# Patient Record
Sex: MALE | Race: BLACK OR AFRICAN AMERICAN
[De-identification: names, ages, dates, MRNs, and addresses within clinical notes are randomized per-mention and may not be internally consistent; named-entity substitution may affect disease eponyms.]

---

## 2018-11-21 ENCOUNTER — HOSPITAL ENCOUNTER (EMERGENCY)
Dept: HOSPITAL 62 - ER | Age: 33
Discharge: HOME | End: 2018-11-21
Payer: SELF-PAY

## 2018-11-21 VITALS — DIASTOLIC BLOOD PRESSURE: 79 MMHG | SYSTOLIC BLOOD PRESSURE: 133 MMHG

## 2018-11-21 DIAGNOSIS — R11.2: ICD-10-CM

## 2018-11-21 DIAGNOSIS — B34.9: Primary | ICD-10-CM

## 2018-11-21 DIAGNOSIS — M79.10: ICD-10-CM

## 2018-11-21 DIAGNOSIS — Z87.19: ICD-10-CM

## 2018-11-21 DIAGNOSIS — R05: ICD-10-CM

## 2018-11-21 DIAGNOSIS — R68.83: ICD-10-CM

## 2018-11-21 LAB
%HYPO/RBC NFR BLD AUTO: (no result) %
ADD MANUAL DIFF: (no result)
ADD MANUAL MICROSCOPIC: YES
ALBUMIN SERPL-MCNC: 3.9 G/DL (ref 3.5–5)
ALP SERPL-CCNC: 179 U/L (ref 38–126)
ALT SERPL-CCNC: 48 U/L (ref 21–72)
ANION GAP SERPL CALC-SCNC: 13 MMOL/L (ref 5–19)
ANISOCYTOSIS BLD QL SMEAR: (no result)
APPEARANCE UR: (no result)
APTT PPP: YELLOW S
AST SERPL-CCNC: 32 U/L (ref 17–59)
BACTERIA #/AREA URNS HPF: (no result) /HPF
BASOPHILS # BLD AUTO: 0 10^3/UL (ref 0–0.2)
BASOPHILS NFR BLD AUTO: 0.3 % (ref 0–2)
BILIRUB DIRECT SERPL-MCNC: 0.4 MG/DL (ref 0–0.4)
BILIRUB SERPL-MCNC: 0.5 MG/DL (ref 0.2–1.3)
BILIRUB UR QL STRIP: (no result)
BUN SERPL-MCNC: 13 MG/DL (ref 7–20)
CALCIUM: 10.2 MG/DL (ref 8.4–10.2)
CHLORIDE SERPL-SCNC: 101 MMOL/L (ref 98–107)
CO2 SERPL-SCNC: 23 MMOL/L (ref 22–30)
EOSINOPHIL # BLD AUTO: 0 10^3/UL (ref 0–0.6)
EOSINOPHIL NFR BLD AUTO: 0.4 % (ref 0–6)
ERYTHROCYTE [DISTWIDTH] IN BLOOD BY AUTOMATED COUNT: 24.1 % (ref 11.5–14)
GLUCOSE SERPL-MCNC: 102 MG/DL (ref 75–110)
GLUCOSE UR STRIP-MCNC: NEGATIVE MG/DL
GRAN CASTS #/AREA URNS LPF: (no result) /LPF
HCT VFR BLD CALC: 41.6 % (ref 37.9–51)
HGB BLD-MCNC: 13.7 G/DL (ref 13.5–17)
HYALINE CASTS #/AREA URNS LPF: (no result) /LPF
KETONES UR STRIP-MCNC: NEGATIVE MG/DL
LIPASE SERPL-CCNC: 39.7 U/L (ref 23–300)
LYMPHOCYTES # BLD AUTO: 0.7 10^3/UL (ref 0.5–4.7)
LYMPHOCYTES NFR BLD AUTO: 11.2 % (ref 13–45)
MCH RBC QN AUTO: 22.4 PG (ref 27–33.4)
MCHC RBC AUTO-ENTMCNC: 33 G/DL (ref 32–36)
MCV RBC AUTO: 68 FL (ref 80–97)
MONOCYTES # BLD AUTO: 0.8 10^3/UL (ref 0.1–1.4)
MONOCYTES NFR BLD AUTO: 12.4 % (ref 3–13)
NEUTROPHILS # BLD AUTO: 4.8 10^3/UL (ref 1.7–8.2)
NEUTS SEG NFR BLD AUTO: 75.7 % (ref 42–78)
NITRITE UR QL STRIP: NEGATIVE
OVALOCYTES BLD QL SMEAR: SLIGHT
PH UR STRIP: 5 [PH] (ref 5–9)
PLATELET # BLD: 315 10^3/UL (ref 150–450)
PLATELET COMMENT: ADEQUATE
PLATELET LARGE: PRESENT
POIKILOCYTOSIS BLD QL SMEAR: SLIGHT
POTASSIUM SERPL-SCNC: 4.6 MMOL/L (ref 3.6–5)
PROT SERPL-MCNC: 8.6 G/DL (ref 6.3–8.2)
PROT UR STRIP-MCNC: 100 MG/DL
RBC # BLD AUTO: 6.13 10^6/UL (ref 4.35–5.55)
RBC #/AREA URNS HPF: (no result) /HPF
SODIUM SERPL-SCNC: 137.1 MMOL/L (ref 137–145)
SP GR UR STRIP: 1.02
TOTAL CELLS COUNTED % (AUTO): 100 %
UROBILINOGEN UR-MCNC: NEGATIVE MG/DL (ref ?–2)
WBC # BLD AUTO: 6.4 10^3/UL (ref 4–10.5)
WBC CASTS URNS QL MICRO: (no result) /LPF

## 2018-11-21 PROCEDURE — 96374 THER/PROPH/DIAG INJ IV PUSH: CPT

## 2018-11-21 PROCEDURE — 99284 EMERGENCY DEPT VISIT MOD MDM: CPT

## 2018-11-21 PROCEDURE — 36415 COLL VENOUS BLD VENIPUNCTURE: CPT

## 2018-11-21 PROCEDURE — 96361 HYDRATE IV INFUSION ADD-ON: CPT

## 2018-11-21 PROCEDURE — 81001 URINALYSIS AUTO W/SCOPE: CPT

## 2018-11-21 PROCEDURE — 80053 COMPREHEN METABOLIC PANEL: CPT

## 2018-11-21 PROCEDURE — 96375 TX/PRO/DX INJ NEW DRUG ADDON: CPT

## 2018-11-21 PROCEDURE — 85025 COMPLETE CBC W/AUTO DIFF WBC: CPT

## 2018-11-21 PROCEDURE — 83690 ASSAY OF LIPASE: CPT

## 2018-11-21 PROCEDURE — S0028 INJECTION, FAMOTIDINE, 20 MG: HCPCS

## 2018-11-21 NOTE — ER DOCUMENT REPORT
ED General





- General


Chief Complaint: Nausea/Vomiting


Stated Complaint: GENERAL WEAKNESS


Time Seen by Provider: 11/21/18 12:51


Mode of Arrival: Medic


Information source: Patient, Emergency Med Personnel, [facility] Records


Notes: 





33-year-old male with colitis, chronic anemia presents with complaint of nausea

, vomiting and body aches that started 3 days prior to arrival.  Patient states 

that he has had approximately 2 episodes of vomiting per day.  He denies any 

blood.  Patient has diffuse body aches.  He has a intermittent nonproductive 

cough.  He does have sick contacts at home with similar symptoms.  Patient 

denies recent travel, recent antibiotic use.


TRAVEL OUTSIDE OF THE [place] IN LAST 30 DAYS: No





- HPI


Onset: Other


Onset/Duration: Gradual, Persistent


Quality of pain: Achy


Severity: Mild


Associated symptoms: Chills, Nausea, Vomiting


Exacerbated by: Food


Relieved by: Denies


Similar symptoms previously: Yes


Recently seen / treated by doctor: Yes





Past Medical History





- General


Information source: Patient, [facility] Records





- Social History


Smoking Status: Never Smoker


Chew tobacco use (# tins/day): No


Frequency of alcohol use: None


Drug Abuse: None


Lives with: Family


Family History: Reviewed & Not Pertinent


Patient has suicidal ideation: No


Patient has homicidal ideation: No


Renal/ Medical History: Denies: Hx Peritoneal Dialysis


GI Medical History: Reports: Other - Colitis





Review of Systems





- Review of Systems


Notes: 





REVIEW OF SYSTEMS:


CONSTITUTIONAL :  Denies fever,  chills, or sweats.  Denies recent illness. 

Denies weight loss, recent hospitalizations. 


EENT: Denies visual changes, eye pain.  Denies sore throat, oral lesions, 

difficulty swallowing.


CARDIOVASCULAR:  Denies chest pain.  Denies palpitations. Denies lower 

extremity edema.


RESPIRATORY:  Denies cough. Denies shortness of breath, wheezing.


GASTROINTESTINAL:  Denies abdominal pain or distention.  Denies diarrhea.  

Denies blood in vomitus, stools, or per rectum.  Denies black, tarry stools.  

Denies constipation.  


GENITOURINARY:  Denies difficulty urinating, painful urination,  frequency, 

blood in urine, testicular pain or penile discharge.


MUSCULOSKELETAL:  Denies  neck pain or stiffness.  Denies joint  swelling.


SKIN:   Denies rash, lesions or sores.


HEMATOLOGIC :   Denies easy bruising or bleeding.


LYMPHATIC:  Denies swollen glands.


NEUROLOGICAL:  Denies confusion or altered mental status.  Denies loss of 

consciousness.  Denies dizziness or lightheadedness.  Denies headache.  Denies 

weakness or paralysis.  Denies problems difficulty with ambulation, slurred 

speech.  Denies sensory loss, numbness, or tingling.  Denies seizures.


PSYCHIATRIC:  Denies anxiety or stress.  Denies depression, suicidal ideation, 

or





Physical Exam





- Vital signs


Vitals: 


 











Temp Pulse BP Pulse Ox


 


 98.5 F   91   133/79 H  100 


 


 11/21/18 15:33  11/21/18 15:33  11/21/18 15:33  11/21/18 15:33











Interpretation: No: Febrile





- Notes


Notes: 





PHYSICAL EXAMINATION:





GENERAL: Well-appearing, well-nourished and in no acute distress.





HEAD: Atraumatic, normocephalic.





EYES: Pupils equal round and reactive to light, extraocular movements intact, 

sclera anicteric, conjunctiva are normal.





ENT: Nares patent, oropharynx clear without exudates.  Moist mucous membranes.





NECK: Normal range of motion, supple without lymphadenopathy





LUNGS: Breath sounds clear to auscultation bilaterally and equal.  No wheezes 

rales or rhonchi.





HEART: Regular rate and rhythm without murmurs





ABDOMEN: Soft, nontender, nondistended abdomen.  No guarding, no rebound.  No 

masses appreciated.





Musculoskeletal: Normal range of motion, no pitting or edema.  No cyanosis.





NEUROLOGICAL: Cranial nerves grossly intact.  Normal speech, normal gait.  

Normal sensory, motor exams 





PSYCH: Normal mood, normal affect.





SKIN: Warm, Dry, normal turgor, no rashes or lesions noted.





Course





- Re-evaluation


Re-evalutation: 








Laboratory











  11/21/18 11/21/18 11/21/18





  13:30 13:30 14:01


 


WBC  6.4  


 


RBC  6.13 H  


 


Hgb  13.7  


 


Hct  41.6  


 


MCV  68 L  


 


MCH  22.4 L  


 


MCHC  33.0  


 


RDW  24.1 H  


 


Plt Count  315  


 


Seg Neutrophils %  75.7  


 


Lymphocytes %  11.2 L  


 


Monocytes %  12.4  


 


Eosinophils %  0.4  


 


Basophils %  0.3  


 


Absolute Neutrophils  4.8  


 


Absolute Lymphocytes  0.7  


 


Absolute Monocytes  0.8  


 


Absolute Eosinophils  0.0  


 


Absolute Basophils  0.0  


 


Large Platelets  PRESENT  


 


Platelet Comment  ADEQUATE  


 


Hypochromasia  1+  


 


Poikilocytosis  SLIGHT  


 


Anisocytosis  3+  


 


Microcytosis  2+  


 


Ovalocytes  SLIGHT  


 


Sodium   137.1 


 


Potassium   4.6 


 


Chloride   101 


 


Carbon Dioxide   23 


 


Anion Gap   13 


 


BUN   13 


 


Creatinine   0.95 


 


Est GFR ( Amer)   > 60 


 


Est GFR (Non-Af Amer)   > 60 


 


Glucose   102 


 


Calcium   10.2 


 


Total Bilirubin   0.5 


 


Direct Bilirubin   0.4 


 


Neonat Total Bilirubin   Not Reportable 


 


Neonat Direct Bilirubin   Not Reportable 


 


Neonat Indirect Bili   Not Reportable 


 


AST   32 


 


ALT   48 


 


Alkaline Phosphatase   179 H 


 


Total Protein   8.6 H 


 


Albumin   3.9 


 


Lipase   39.7 


 


Urine Color    YELLOW


 


Urine Appearance    CLOUDY


 


Urine pH    5.0


 


Ur Specific Gravity    1.023


 


Urine Protein    100 H


 


Urine Glucose (UA)    NEGATIVE


 


Urine Ketones    NEGATIVE


 


Urine Blood    NEGATIVE


 


Urine Nitrite    NEGATIVE


 


Urine Bilirubin    SMALL H


 


Urine Urobilinogen    NEGATIVE


 


Ur Leukocyte Esterase    NEGATIVE


 


Urine RBC    1-5


 


Urine WBC    10-20


 


Ur Squamous Epith Cells    MODERATE


 


Urine Bacteria    2+


 


Hyaline Casts    20-30


 


Granular Casts    1-5


 


WBC Casts    5-10


 


Urine Mucus    2+


 


Urine Ascorbic Acid    NEGATIVE








Presentation of an overall well-appearing patient in no acute distress with 

complaints of nausea, vomiting.  This is consistent with likely viral 

gastroenteritis.  Patient has no abdominal tenderness on exam and specifically 

no tenderness in the RLQ, LLQ, RUQ.  Overall well hydrated on exam.  Able to 

tolerate oral intake here in the emergency department. Low clinical suspicion 

for any acute life-threatening etiology based on exam and history including 

acute cholecystitis, SBO, appendicitis, nephrolithiasis, or pylonephritis, 

pancreatitis. CMP without evidence of acute hepatitis or significant 

dehydration.  


11/21/18 15:11


Patient reevaluated and states he is feeling better.  His nausea has resolved.  

He has been able to tolerate fluids.  Patient was evaluated and treated as 

appropriate for the patient's presenting symptoms and complaint, with 

consideration of any critical or life threatening conditions that may be 

associated with their obtained history and exam as noted above. All results 

were discussed with patient. Patient provided the opportunity to ask questions, 

and express concerns. Patient was educated on treatments based on their 

presumed diagnosis as noted above.  At this time we will discharge the patient 

with return precautions and follow-up recommendations.  Verbal discharge 

instructions given a the bedside. Medication warnings reviewed. Patient is in 

agreement with this plan and has verbalized understanding of return 

precautions. 





After careful consideration I feel that that patient can be safely discharged 

from the emergency department,  they were advised to followup with a primary 

care physician in 2-3 days. 








Dictation on this chart was performed using voice recognition software and may 

result in unintended grammatical, spelling, syntax or errors.














11/21/18 21:14





11/21/18 21:15





11/21/18 21:17





11/21/18 21:18


.





- Vital Signs


Vital signs: 


 











Temp Pulse Resp BP Pulse Ox


 


 98.5 F   91      133/79 H  100 


 


 11/21/18 15:33  11/21/18 15:33     11/21/18 15:33  11/21/18 15:33














- Laboratory


Result Diagrams: 


 11/21/18 13:30





 11/21/18 13:30


Laboratory results interpreted by me: 


 











  11/21/18 11/21/18 11/21/18





  13:30 13:30 14:01


 


RBC  6.13 H  


 


MCV  68 L  


 


MCH  22.4 L  


 


RDW  24.1 H  


 


Lymphocytes %  11.2 L  


 


Alkaline Phosphatase   179 H 


 


Total Protein   8.6 H 


 


Urine Protein    100 H


 


Urine Bilirubin    SMALL H














Discharge





- Discharge


Clinical Impression: 


 Myalgia, Viral illness, History of colitis





Nausea & vomiting


Qualifiers:


 Vomiting type: unspecified Vomiting Intractability: non-intractable Qualified 

Code(s): R11.2 - Nausea with vomiting, unspecified





Condition: Good


Disposition: HOME, SELF-CARE


Instructions:  Antinausea Medication ([facility]), Intravenous (IV) Fluids ([facility]), 

Viral Syndrome ([facility]), Vomiting ([facility])


Additional Instructions: 


Recommendations:


 


Take medicine as prescribed for nausea and vomiting.


Drink small amounts of fluid often, advance diet slowly.


Return to the emergency room for worsening vomiting, inability to tolerate 

fluids, feeling faint, or concerns it or getting worse.


Follow-up with your physician within the next two days. 


If you are unable to get inn to see your physician, return to the ER for 

evaulation.








You have been seen in the Emergency Department (ED)  today for nausea and 

vomiting.  Your work up today has not shown a clear cause for your symptoms.


You have been prescribed Zofran; please use as prescribed as needed for your 

nausea.





Follow up with your doctor as soon as possible regarding today's emergent visit 

and your symptoms of nausea. 





Return to the Emergency Department (ED)  if you develop abdominal pain, bloody 

vomiting, bloody diarrhea, if you are unable to tolerate fluids due to vomiting

, or if you develop other symptoms that concern you.


Prescriptions: 


Famotidine [Pepcid 40 mg Tablet] 40 mg PO DAILY #14 tablet


Forms:  Elevated Blood Pressure

## 2018-11-22 ENCOUNTER — HOSPITAL ENCOUNTER (EMERGENCY)
Dept: HOSPITAL 62 - ER | Age: 33
Discharge: HOME | End: 2018-11-22
Payer: SELF-PAY

## 2018-11-22 VITALS — SYSTOLIC BLOOD PRESSURE: 123 MMHG | DIASTOLIC BLOOD PRESSURE: 71 MMHG

## 2018-11-22 DIAGNOSIS — K51.911: Primary | ICD-10-CM

## 2018-11-22 DIAGNOSIS — Z91.120: ICD-10-CM

## 2018-11-22 DIAGNOSIS — T39.4X6A: ICD-10-CM

## 2018-11-22 DIAGNOSIS — Z91.14: ICD-10-CM

## 2018-11-22 LAB
ADD MANUAL DIFF: (no result)
ALBUMIN SERPL-MCNC: 4 G/DL (ref 3.5–5)
ALP SERPL-CCNC: 176 U/L (ref 38–126)
ALT SERPL-CCNC: 39 U/L (ref 21–72)
ANION GAP SERPL CALC-SCNC: 13 MMOL/L (ref 5–19)
ANISOCYTOSIS BLD QL SMEAR: (no result)
AST SERPL-CCNC: 20 U/L (ref 17–59)
BASOPHILS # BLD AUTO: 0 10^3/UL (ref 0–0.2)
BASOPHILS NFR BLD AUTO: 0.4 % (ref 0–2)
BILIRUB DIRECT SERPL-MCNC: 0.3 MG/DL (ref 0–0.4)
BILIRUB SERPL-MCNC: 0.4 MG/DL (ref 0.2–1.3)
BUN SERPL-MCNC: 15 MG/DL (ref 7–20)
CALCIUM: 10.3 MG/DL (ref 8.4–10.2)
CHLORIDE SERPL-SCNC: 95 MMOL/L (ref 98–107)
CO2 SERPL-SCNC: 28 MMOL/L (ref 22–30)
EOSINOPHIL # BLD AUTO: 0.1 10^3/UL (ref 0–0.6)
EOSINOPHIL NFR BLD AUTO: 1.1 % (ref 0–6)
ERYTHROCYTE [DISTWIDTH] IN BLOOD BY AUTOMATED COUNT: 24.6 % (ref 11.5–14)
GLUCOSE SERPL-MCNC: 110 MG/DL (ref 75–110)
HCT VFR BLD CALC: 45.3 % (ref 37.9–51)
HGB BLD-MCNC: 14.7 G/DL (ref 13.5–17)
LIPASE SERPL-CCNC: 52.3 U/L (ref 23–300)
LYMPHOCYTES # BLD AUTO: 1.2 10^3/UL (ref 0.5–4.7)
LYMPHOCYTES NFR BLD AUTO: 14.7 % (ref 13–45)
MCH RBC QN AUTO: 22.1 PG (ref 27–33.4)
MCHC RBC AUTO-ENTMCNC: 32.4 G/DL (ref 32–36)
MCV RBC AUTO: 68 FL (ref 80–97)
MONOCYTES # BLD AUTO: 1.2 10^3/UL (ref 0.1–1.4)
MONOCYTES NFR BLD AUTO: 15.1 % (ref 3–13)
NEUTROPHILS # BLD AUTO: 5.4 10^3/UL (ref 1.7–8.2)
NEUTS SEG NFR BLD AUTO: 68.7 % (ref 42–78)
OVALOCYTES BLD QL SMEAR: SLIGHT
PLATELET # BLD: 345 10^3/UL (ref 150–450)
PLATELET COMMENT: ADEQUATE
POTASSIUM SERPL-SCNC: 4.1 MMOL/L (ref 3.6–5)
PROT SERPL-MCNC: 8.7 G/DL (ref 6.3–8.2)
RBC # BLD AUTO: 6.65 10^6/UL (ref 4.35–5.55)
SODIUM SERPL-SCNC: 136.1 MMOL/L (ref 137–145)
TARGETS BLD QL SMEAR: SLIGHT
TOTAL CELLS COUNTED % (AUTO): 100 %
WBC # BLD AUTO: 7.9 10^3/UL (ref 4–10.5)

## 2018-11-22 PROCEDURE — S0028 INJECTION, FAMOTIDINE, 20 MG: HCPCS

## 2018-11-22 PROCEDURE — 80053 COMPREHEN METABOLIC PANEL: CPT

## 2018-11-22 PROCEDURE — 96375 TX/PRO/DX INJ NEW DRUG ADDON: CPT

## 2018-11-22 PROCEDURE — 74022 RADEX COMPL AQT ABD SERIES: CPT

## 2018-11-22 PROCEDURE — 96374 THER/PROPH/DIAG INJ IV PUSH: CPT

## 2018-11-22 PROCEDURE — 85025 COMPLETE CBC W/AUTO DIFF WBC: CPT

## 2018-11-22 PROCEDURE — 36415 COLL VENOUS BLD VENIPUNCTURE: CPT

## 2018-11-22 PROCEDURE — 83690 ASSAY OF LIPASE: CPT

## 2018-11-22 PROCEDURE — 82272 OCCULT BLD FECES 1-3 TESTS: CPT

## 2018-11-22 PROCEDURE — 96361 HYDRATE IV INFUSION ADD-ON: CPT

## 2018-11-22 PROCEDURE — 99284 EMERGENCY DEPT VISIT MOD MDM: CPT

## 2018-11-22 NOTE — RADIOLOGY REPORT (SQ)
EXAM DESCRIPTION:  ACUTE ABDOMEN SERIES



COMPLETED DATE/TIME:  11/22/2018 3:55 pm



REASON FOR STUDY:  Abdominal pain



COMPARISON:  None.



NUMBER OF VIEWS:  Three views.



TECHNIQUE:  Frontal chest, supine abdomen and upright abdomen radiographic images acquired.



LIMITATIONS:  None.



FINDINGS:  CHEST: Lungs clear of infiltrates.

FREE AIR: None. No abnormal gas collections.

BOWEL GAS PATTERN: Paucity of bowel gas.  Stomach distended with fluid.

CALCIFICATIONS: No suspicious calcifications.

HARDWARE: None in the abdomen.

SOFT TISSUES: No gross mass or suggestion of organomegaly.

BONES: No acute fracture.  No worrisome bone lesions.

OTHER: No other significant finding.



IMPRESSION:  Paucity of bowel gas.  Stomach distended with fluid.

No acute pulmonary infiltrates.



TECHNICAL DOCUMENTATION:  JOB ID:  [id number]

 2011 [employer]- All Rights Reserved



Reading location - IP/workstation name: [email or web address]

## 2018-11-22 NOTE — ER DOCUMENT REPORT
ED General





- General


Chief Complaint: Lower Abdominal Pain


Stated Complaint: ABDOMINAL PAIN


Time Seen by Provider: 11/22/18 15:33


Mode of Arrival: Ambulatory


Information source: Patient, [facility] Records


Notes: 





33-year-old male with ulcerative colitis presents for the second time in 2 days 

with complaint of abdominal cramping, diarrhea and bright red blood per rectum.

  Patient was on Humira but after a loss of insurance has not been able to 

afford it.  He currently has no primary care physician.  He denies any fever, 

chills.  He was seen by myself yesterday for nausea, vomiting which she says 

has improved.


TRAVEL OUTSIDE OF THE [place] IN LAST 30 DAYS: No





- HPI


Onset: Other


Onset/Duration: Gradual, Persistent


Quality of pain: Cramping


Severity: Mild


Associated symptoms: Diarrhea, Nausea.  denies: Chest pain, Fever, Vomiting


Exacerbated by: Food


Relieved by: Denies


Similar symptoms previously: Yes


Recently seen / treated by doctor: Yes





- Related Data


Allergies/Adverse Reactions: 


 





No Known Allergies Allergy (Verified 11/22/18 15:12)


 











Past Medical History





- General


Information source: Patient, [facility] Records





- Social History


Smoking Status: Never Smoker


Frequency of alcohol use: None


Drug Abuse: None


Lives with: Family


Family History: Reviewed & Not Pertinent


Patient has suicidal ideation: No


Patient has homicidal ideation: No


Renal/ Medical History: Denies: Hx Peritoneal Dialysis


GI Medical History: Reports: Hx Ulcerative Colitis





Review of Systems





- Review of Systems


Notes: 





REVIEW OF SYSTEMS:


CONSTITUTIONAL :  Denies fever,  chills, or sweats.  Denies recent illness. 

Denies weight loss, recent hospitalizations. 


EENT: Denies visual changes, eye pain.  Denies sore throat, oral lesions, 

difficulty swallowing.


CARDIOVASCULAR:  Denies chest pain.  Denies palpitations. Denies lower 

extremity edema.


RESPIRATORY:  Denies cough. Denies shortness of breath, wheezing.


GASTROINTESTINAL:  Denies abdominal  distention.  Denies blood in vomitus,  or 

per rectum.  Denies black, tarry stools.  Denies constipation.  


GENITOURINARY:  Denies difficulty urinating, painful urination,  frequency, 

blood in urine, testicular pain or penile discharge.


MUSCULOSKELETAL:  Denies back or neck pain or stiffness.  Denies joint pain or 

swelling.


SKIN:   Denies rash, lesions or sores.


HEMATOLOGIC :   Denies easy bruising or bleeding.


LYMPHATIC:  Denies swollen glands.


NEUROLOGICAL:  Denies confusion or altered mental status.  Denies loss of 

consciousness.  Denies dizziness or lightheadedness.  Denies headache.  Denies 

weakness or paralysis.  Denies problems difficulty with ambulation, slurred 

speech.  Denies sensory loss, numbness, or tingling.  Denies seizures.


PSYCHIATRIC:  Denies anxiety or stress.  Denies depression, suicidal ideation, 

or





Physical Exam





- Vital signs


Vitals: 


 











Temp Pulse Resp BP Pulse Ox


 


 98.0 F   99   20   130/85 H  97 


 


 11/22/18 15:15  11/22/18 15:15  11/22/18 15:15  11/22/18 15:15  11/22/18 15:15











Interpretation: Hypertensive





- Notes


Notes: 





PHYSICAL EXAMINATION:





GENERAL: Thin, no acute distress.





HEAD: Atraumatic, normocephalic.





EYES: Pupils equal round and reactive to light, extraocular movements intact, 

sclera anicteric, conjunctiva are normal.





ENT: Nares patent, oropharynx clear without exudates.  Moist mucous membranes.





NECK: Normal range of motion, supple without lymphadenopathy





LUNGS: Breath sounds clear to auscultation bilaterally and equal.  No wheezes 

rales or rhonchi.





HEART: Regular rate and rhythm without murmurs





ABDOMEN: Soft, nontender, nondistended abdomen.  No guarding, no rebound.  No 

masses appreciated.


Rectal: No active bleeding, bright red blood noted. 





Musculoskeletal: Normal range of motion, no pitting or edema.  No cyanosis.





NEUROLOGICAL: Cranial nerves grossly intact.  Normal speech, normal gait.  

Normal sensory, motor exams 





PSYCH: Normal mood, normal affect.





SKIN: Warm, Dry, normal turgor, no rashes or lesions noted.





Course





- Re-evaluation


Re-evalutation: 








Laboratory











  11/22/18 11/22/18 11/22/18





  16:18 16:18 16:36


 


WBC  7.9  


 


RBC  6.65 H  


 


Hgb  14.7  


 


Hct  45.3  


 


MCV  68 L  


 


MCH  22.1 L  


 


MCHC  32.4  


 


RDW  24.6 H  


 


Plt Count  345  


 


Seg Neutrophils %  68.7  


 


Lymphocytes %  14.7  


 


Monocytes %  15.1 H  


 


Eosinophils %  1.1  


 


Basophils %  0.4  


 


Absolute Neutrophils  5.4  


 


Absolute Lymphocytes  1.2  


 


Absolute Monocytes  1.2  


 


Absolute Eosinophils  0.1  


 


Absolute Basophils  0.0  


 


Platelet Comment  ADEQUATE  


 


Anisocytosis  1+  


 


Microcytosis  2+  


 


Target Cells  SLIGHT  


 


Ovalocytes  SLIGHT  


 


Sodium   136.1 L 


 


Potassium   4.1 


 


Chloride   95 L 


 


Carbon Dioxide   28 


 


Anion Gap   13 


 


BUN   15 


 


Creatinine   0.96 


 


Est GFR ( Amer)   > 60 


 


Est GFR (Non-Af Amer)   > 60 


 


Glucose   110 


 


Calcium   10.3 H 


 


Total Bilirubin   0.4 


 


Direct Bilirubin   0.3 


 


Neonat Total Bilirubin   Not Reportable 


 


Neonat Direct Bilirubin   Not Reportable 


 


Neonat Indirect Bili   Not Reportable 


 


AST   20 


 


ALT   39 


 


Alkaline Phosphatase   176 H 


 


Total Protein   8.7 H 


 


Albumin   4.0 


 


Lipase   52.3 


 


Stool Occult Blood    POSITIVE








33-year-old male with ulcerative colitis presents with bloody stools that 

started today.  Was seen yesterday for nausea and vomiting and abdominal 

cramping which he states has improved.  Patient was not having bloody diarrhea 

yesterday.  Patient has been off of his Humira since losing his insurance.  He 

has not attempted to enroll in medication assistance program.  He does not 

currently have a primary care physician.  Reviewed Humira and similar 

medications on [facility] and the cheapest medication with a disc count is $215 

for mesalamine.  I did print out this coupon for the patient and I did 

prescribe this for him.  Other alternatives are at least $1500 which the 

patient cannot currently afford.  Unfortunately there are no affordable 

alternatives to his Humira that I can find at this time.  I also placed a 

social work consult for the patient.  He did receive Anusol rectally, steroids, 

mesalamine, Reglan, fentanyl.  Vital signs stable upon arrival.  Patient does 

not appear toxic although he is very thin.  No focal abdominal tenderness.  No 

active vomiting.  He is not tachycardic, hypotensive.


11/22/18 16:52


Discussed medication assistance that is available for the patient.  He states 

that he is aware of this but has not signed up at this time.  Patient currently 

has no insurance, no primary care physician.








11/22/18 17:21


Patient reevaluated and states that his abdominal pain has improved.  He was 

able to tolerate the suppository.  He is asking to eat.  CBC shows a hemoglobin 

of 14 which is better than it was yesterday.  CMP shows no signal or evidence 

of dehydration.  Stool occult is positive which is not surprising.  I spoke to 

the patient at length regarding our options.  Discussed that he should attempt 

to sign up for medication assistance.  Patient given a turkey dinner for 

Thanksgiving which he tolerated.


11/22/18 17:37


Patient reevaluated.  He is tolerating food.  He denies nausea, vomiting, 

abdominal pain.  Patient discharged home with a prescription for mesalamine, 

prednisone.  Encouraged to return if bleeding persists or worsens.  I will 

follow-up with social work regarding options for this patient.





- Vital Signs


Vital signs: 


 











Temp Pulse Resp BP Pulse Ox


 


 98.4 F   80   16   123/71   98 


 


 11/22/18 19:35  11/22/18 19:35  11/22/18 19:35  11/22/18 19:35  11/22/18 19:35














- Laboratory


Result Diagrams: 


 11/22/18 16:18





 11/22/18 16:18


Laboratory results interpreted by me: 


 











  11/22/18 11/22/18





  16:18 16:18


 


RBC  6.65 H 


 


MCV  68 L 


 


MCH  22.1 L 


 


RDW  24.6 H 


 


Monocytes %  15.1 H 


 


Sodium   136.1 L


 


Chloride   95 L


 


Calcium   10.3 H


 


Alkaline Phosphatase   176 H


 


Total Protein   8.7 H














- Diagnostic Test


Radiology reviewed: Image reviewed, Reports reviewed





Discharge





- Discharge


Clinical Impression: 


 History of colitis, Bright red blood per rectum, Abdominal cramping, Elevated 

blood pressure reading





Condition: Good


Disposition: HOME, SELF-CARE


Instructions:  Ulcerative Colitis ([facility])


Additional Instructions: 


Please go to [employer] and type in the medications you are supposed to be 

taking and apply for their assistance program.  I have put in a social work 

consult for you and you should be hearing from our  within the 

next week.  Please return to the emergency department if your bleeding worsens.

  Follow up with your [id number] hours  for further care or return to the 

ED IMMEDIATELY if symptoms worsen or you have any concerns.  If you cannot 

afford to follow up with your primary care physician a list of low cost clinics 

have been provided at the end of your discharge papers as well.











Most prescribed medications have multiple side effects.  The safest thing to do 

is when filling your prescription  speak to your pharmacist regarding possible 

interactions with your normal home medications and over the counter medications 

such as Ibuprofen, Tylenol, Benadryl.  If you experience any symptoms that 

cause you discomfort or concern you should discontinue the medication 

immediately and return to the emergency room or call your primary care 

physician.





Recommendations:


 


It is recommended to followup with a primary care doctor within the next 2 

days.  If you do not have a primary care doctor or you are unable to get an 

apointment during that time, I left the number for some internal medicine 

physicians that are affiliated with this [facility].


 


Dr. Marilu Peterson [facility] 


[address], [place] [id number] [phone]


 


Dr Thompson


Address: [address] [place] [address] 


[phone]


 


Dr Zhang


Address: [address] [place] [address] , [place] [address] 


[phone]


 


Prescriptions: 


Mesalamine 1,600 mg PO TID #90 tablet.


Prednisone 10 mg PO ASDIR 12 Days #1 tab.ds.pk


Forms:  Elevated Blood Pressure


Referrals: 


NASIM SOOD MD [HONORARY] - Follow up in 3-5 days


DAIANA DOOLEY MD [ACTIVE STAFF] - Follow up in 3-5 days

## 2018-11-22 NOTE — ER DOCUMENT REPORT
ED Medical Screen (RME)





- General


Chief Complaint: Lower Abdominal Pain


Stated Complaint: ABDOMINAL PAIN


Time Seen by Provider: 11/22/18 15:33


Notes: 





33 years old male with a history of ulcerative colitis, was on Humira, could 

not afford it and not been treated recently.  Presents today with abdominal 

pain and bloody stools.  General weakness.





Appears cachectic, conjunctiva pale, carrying bloody stool with him.


TRAVEL OUTSIDE OF THE [place] IN LAST 30 DAYS: No





- Related Data


Allergies/Adverse Reactions: 


 





No Known Allergies Allergy (Verified 11/22/18 15:12)


 











Past Medical History


Renal/ Medical History: Denies: Hx Peritoneal Dialysis





Physical Exam





- Vital signs


Vitals: 





 











Temp Pulse Resp BP Pulse Ox


 


 98.0 F   99   20   130/85 H  97 


 


 11/22/18 15:15  11/22/18 15:15  11/22/18 15:15  11/22/18 15:15  11/22/18 15:15














Course





- Vital Signs


Vital signs: 





 











Temp Pulse Resp BP Pulse Ox


 


 98.0 F   99   20   130/85 H  97 


 


 11/22/18 15:15  11/22/18 15:15  11/22/18 15:15  11/22/18 15:15  11/22/18 15:15